# Patient Record
Sex: MALE | Race: WHITE
[De-identification: names, ages, dates, MRNs, and addresses within clinical notes are randomized per-mention and may not be internally consistent; named-entity substitution may affect disease eponyms.]

---

## 2018-03-14 ENCOUNTER — HOSPITAL ENCOUNTER (EMERGENCY)
Dept: HOSPITAL 47 - EC | Age: 83
Discharge: ANOTHER HOSPITAL | End: 2018-03-14
Payer: MEDICARE

## 2018-03-14 VITALS — RESPIRATION RATE: 20 BRPM | DIASTOLIC BLOOD PRESSURE: 73 MMHG | SYSTOLIC BLOOD PRESSURE: 105 MMHG | HEART RATE: 74 BPM

## 2018-03-14 DIAGNOSIS — Z86.711: ICD-10-CM

## 2018-03-14 DIAGNOSIS — Z79.02: ICD-10-CM

## 2018-03-14 DIAGNOSIS — Z79.899: ICD-10-CM

## 2018-03-14 DIAGNOSIS — L03.115: ICD-10-CM

## 2018-03-14 DIAGNOSIS — T81.4XXA: Primary | ICD-10-CM

## 2018-03-14 DIAGNOSIS — Z86.718: ICD-10-CM

## 2018-03-14 DIAGNOSIS — Z89.611: ICD-10-CM

## 2018-03-14 DIAGNOSIS — I48.91: ICD-10-CM

## 2018-03-14 DIAGNOSIS — I96: ICD-10-CM

## 2018-03-14 DIAGNOSIS — Z79.01: ICD-10-CM

## 2018-03-14 DIAGNOSIS — K21.9: ICD-10-CM

## 2018-03-14 DIAGNOSIS — Z88.6: ICD-10-CM

## 2018-03-14 DIAGNOSIS — Z86.79: ICD-10-CM

## 2018-03-14 LAB
ALBUMIN SERPL-MCNC: 3.3 G/DL (ref 3.5–5)
ALP SERPL-CCNC: 126 U/L (ref 38–126)
ALT SERPL-CCNC: 17 U/L (ref 21–72)
ANION GAP SERPL CALC-SCNC: 9 MMOL/L
APTT BLD: 28.2 SEC (ref 22–30)
AST SERPL-CCNC: 19 U/L (ref 17–59)
BASOPHILS # BLD AUTO: 0 K/UL (ref 0–0.2)
BASOPHILS NFR BLD AUTO: 0 %
BUN SERPL-SCNC: 33 MG/DL (ref 9–20)
CALCIUM SPEC-MCNC: 9 MG/DL (ref 8.4–10.2)
CHLORIDE SERPL-SCNC: 101 MMOL/L (ref 98–107)
CK SERPL-CCNC: 51 U/L (ref 55–170)
CO2 SERPL-SCNC: 29 MMOL/L (ref 22–30)
EOSINOPHIL # BLD AUTO: 0.4 K/UL (ref 0–0.7)
EOSINOPHIL NFR BLD AUTO: 5 %
ERYTHROCYTE [DISTWIDTH] IN BLOOD BY AUTOMATED COUNT: 3.33 M/UL (ref 4.3–5.9)
ERYTHROCYTE [DISTWIDTH] IN BLOOD: 15.7 % (ref 11.5–15.5)
GLUCOSE SERPL-MCNC: 156 MG/DL (ref 74–99)
HCT VFR BLD AUTO: 27.1 % (ref 39–53)
HGB BLD-MCNC: 8.3 GM/DL (ref 13–17.5)
INR PPP: 3 (ref ?–1.2)
LYMPHOCYTES # SPEC AUTO: 0.9 K/UL (ref 1–4.8)
LYMPHOCYTES NFR SPEC AUTO: 10 %
MAGNESIUM SPEC-SCNC: 2.2 MG/DL (ref 1.6–2.3)
MCH RBC QN AUTO: 25.1 PG (ref 25–35)
MCHC RBC AUTO-ENTMCNC: 30.8 G/DL (ref 31–37)
MCV RBC AUTO: 81.5 FL (ref 80–100)
MONOCYTES # BLD AUTO: 0.8 K/UL (ref 0–1)
MONOCYTES NFR BLD AUTO: 8 %
NEUTROPHILS # BLD AUTO: 7 K/UL (ref 1.3–7.7)
NEUTROPHILS NFR BLD AUTO: 75 %
PLATELET # BLD AUTO: 334 K/UL (ref 150–450)
POTASSIUM SERPL-SCNC: 4.3 MMOL/L (ref 3.5–5.1)
PROT SERPL-MCNC: 6.3 G/DL (ref 6.3–8.2)
PT BLD: 26.5 SEC (ref 9–12)
SODIUM SERPL-SCNC: 139 MMOL/L (ref 137–145)
TROPONIN I SERPL-MCNC: 0.01 NG/ML (ref 0–0.03)
WBC # BLD AUTO: 9.4 K/UL (ref 3.8–10.6)

## 2018-03-14 PROCEDURE — 84484 ASSAY OF TROPONIN QUANT: CPT

## 2018-03-14 PROCEDURE — 80053 COMPREHEN METABOLIC PANEL: CPT

## 2018-03-14 PROCEDURE — 93005 ELECTROCARDIOGRAM TRACING: CPT

## 2018-03-14 PROCEDURE — 96375 TX/PRO/DX INJ NEW DRUG ADDON: CPT

## 2018-03-14 PROCEDURE — 83735 ASSAY OF MAGNESIUM: CPT

## 2018-03-14 PROCEDURE — 82553 CREATINE MB FRACTION: CPT

## 2018-03-14 PROCEDURE — 87040 BLOOD CULTURE FOR BACTERIA: CPT

## 2018-03-14 PROCEDURE — 85610 PROTHROMBIN TIME: CPT

## 2018-03-14 PROCEDURE — 36415 COLL VENOUS BLD VENIPUNCTURE: CPT

## 2018-03-14 PROCEDURE — 85730 THROMBOPLASTIN TIME PARTIAL: CPT

## 2018-03-14 PROCEDURE — 82550 ASSAY OF CK (CPK): CPT

## 2018-03-14 PROCEDURE — 99285 EMERGENCY DEPT VISIT HI MDM: CPT

## 2018-03-14 PROCEDURE — 96374 THER/PROPH/DIAG INJ IV PUSH: CPT

## 2018-03-14 PROCEDURE — 84100 ASSAY OF PHOSPHORUS: CPT

## 2018-03-14 PROCEDURE — 85025 COMPLETE CBC W/AUTO DIFF WBC: CPT

## 2018-03-14 NOTE — ED
General Adult HPI





- General


Stated complaint: infection foot


Time Seen by Provider: 03/14/18 11:03


Source: RN notes reviewed, old records reviewed





- History of Present Illness


Initial comments: 





This is a 7-year-old male to the ER for evaluation of right foot infection.  

Patient was sent in for evaluation from [place].  Patient himself is without 

complaint, denies pain.  Patient does admit history of atrial fibrillation, is 

in nature for ablation with RVR currently.  Patient had increasing and 

spreading erythema of right lower extremity earlier today.  The infection was 

monitored over a couple hours and significantly continue to spread.  No known 

fevers.  Patient denies any increase in pain right lower extremity.  Patient 

was just discharged after surgery





- Related Data


 Home Medications











 Medication  Instructions  Recorded  Confirmed


 


Allopurinol [Zyloprim] 100 mg PO DAILY@0900 12/10/14 03/14/18


 


Acetaminophen Tab [Tylenol Tab] 650 mg PO Q4H PRN 03/14/18 03/14/18


 


Amino Acids/Protein Hydrolys 30 ml PO BID@0900,1700 03/14/18 03/14/18





[Pro-Stat Supplement]   


 


Ammonium Lactate Lotion 1 applic TOPICAL BID 03/14/18 03/14/18





[Lac-Hydrin 12% Lotion]   


 


Atorvastatin [Lipitor] 20 mg PO HS@2100 03/14/18 03/14/18


 


Clopidogrel [Plavix] 75 mg PO DAILY@0900 03/14/18 03/14/18


 


Ferrous Sulfate [Iron] 325 mg PO DAILY@0900 03/14/18 03/14/18


 


Furosemide [Lasix] 40 mg PO DAILY@0600 03/14/18 03/14/18


 


Gabapentin [Neurontin] 100 mg PO BID@0900,2100 03/14/18 03/14/18


 


HYDROcodone/APAP 5-325MG [Norco 1 tab PO Q6H PRN 03/14/18 03/14/18





5-325]   


 


Memantine [Namenda] 5 mg PO BID@0900,2100 03/14/18 03/14/18


 


Metoprolol Succinate (ER) [Toprol 75 mg PO DAILY@0900 03/14/18 03/14/18





Xl]   


 


Multivitamins, Thera [Multivitamin 1 tab PO DAILY@0900 03/14/18 03/14/18





(formulary)]   


 


Pantoprazole Sodium [Protonix] 40 mg PO DAILY@0600 03/14/18 03/14/18


 


Tamsulosin HCl [Flomax] 0.4 mg PO BID@0900,2100 03/14/18 03/14/18


 


Warfarin [Coumadin] 2 mg PO DAILY@1700 03/14/18 03/14/18











 Allergies











Allergy/AdvReac Type Severity Reaction Status Date / Time


 


celecoxib [From Celebrex] Allergy  Unknown Verified 03/14/18 11:28














Review of Systems


ROS Statement: 


Those systems with pertinent positive or pertinent negative responses have been 

documented in the HPI.





ROS Other: All systems not noted in ROS Statement are negative.





Past Medical History


Past Medical History: Deep Vein Thrombosis (DVT), GERD/Reflux, Pulmonary 

Embolus (PE)


Additional Past Medical History / Comment(s): A-FIB,ASBESTOS, ANEURYSM -BRAIN 

AND AORTIC


History of Any Multi-Drug Resistant Organisms: None Reported


Past Surgical History: Prostate Surgery


Additional Past Surgical History / Comment(s): TORN ACHILLES; SINUS SURG


Past Anesthesia/Blood Transfusion Reactions: No Reported Reaction


Past Psychological History: No Psychological Hx Reported


Smoking Status: Never smoker


Past Alcohol Use History: Daily


Past Drug Use History: None Reported





General Exam





- General Exam Comments


Initial Comments: 





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


General appearance: alert, in no apparent distress


Head exam: Present: atraumatic, normocephalic, normal inspection


Eye exam: Present: normal appearance, PERRL, EOMI.  Absent: scleral icterus, 

conjunctival injection, periorbital swelling


ENT exam: Present: normal exam, mucous membranes moist


Neck exam: Present: normal inspection.  Absent: tenderness, meningismus, 

lymphadenopathy


Respiratory exam: Present: normal lung sounds bilaterally.  Absent: respiratory 

distress, wheezes, rales, rhonchi, stridor


Cardiovascular Exam: Present: regular rate, normal rhythm, normal heart sounds.

  Absent: systolic murmur, diastolic murmur, rubs, gallop, clicks


GI/Abdominal exam: Present: soft, normal bowel sounds.  Absent: distended, 

tenderness, guarding, rebound, rigid


Extremities exam: Present: normal inspection, full ROM, normal capillary 

refill.  Absent: tenderness, pedal edema, joint swelling, calf tenderness


Back exam: Present: normal inspection


Neurological exam: Present: alert, oriented X3, CN II-XII intact


Psychiatric exam: Present: normal affect, normal mood


Skin exam: Present: warm, dry, intact, normal color.  Absent: rash





Course


 Vital Signs











  03/14/18





  11:14


 


Pulse Rate 74


 


Respiratory 20





Rate 


 


Blood Pressure 105/73


 


O2 Sat by Pulse 97





Oximetry 














- Reevaluation(s)


Reevaluation #1: 





03/14/18 11:52


Spoke with patient's physician, patient will be transferred for inpatient 

treatment





EKG Findings





- EKG Comments:


EKG Findings:: EKG shows A. fib rate of 94, QRS 70, QTc 455





Medical Decision Making





- Medical Decision Making





87 male the ER with worsening infection.  Infection right lower [place].  

Patient to be transferred to [facility] where he had surgery





Disposition


Clinical Impression: 


 Cellulitis of right lower extremity, Post op infection, Gangrene





Disposition: OTHER INSTITUTION NOT DEFINED


Condition: Fair


Referrals: 


Vicky Bang MD [Primary Care Provider] - 1-2 days





- Out of Hospital Transfer - Req. Specs


Out of Hospital Transfer - Requested Specifics: Other Emergency Center ([facility]